# Patient Record
Sex: FEMALE | Race: ASIAN | NOT HISPANIC OR LATINO
[De-identification: names, ages, dates, MRNs, and addresses within clinical notes are randomized per-mention and may not be internally consistent; named-entity substitution may affect disease eponyms.]

---

## 2017-09-28 ENCOUNTER — RECORDS - HEALTHEAST (OUTPATIENT)
Dept: ADMINISTRATIVE | Facility: OTHER | Age: 37
End: 2017-09-28

## 2017-10-02 ENCOUNTER — COMMUNICATION - HEALTHEAST (OUTPATIENT)
Dept: OBGYN | Facility: HOSPITAL | Age: 37
End: 2017-10-02

## 2018-01-22 ENCOUNTER — COMMUNICATION - HEALTHEAST (OUTPATIENT)
Dept: HEALTH INFORMATION MANAGEMENT | Facility: CLINIC | Age: 38
End: 2018-01-22

## 2018-04-02 ENCOUNTER — COMMUNICATION - HEALTHEAST (OUTPATIENT)
Dept: HEALTH INFORMATION MANAGEMENT | Facility: CLINIC | Age: 38
End: 2018-04-02

## 2018-09-05 ENCOUNTER — RECORDS - HEALTHEAST (OUTPATIENT)
Dept: ADMINISTRATIVE | Facility: OTHER | Age: 38
End: 2018-09-05

## 2021-06-16 PROBLEM — Z34.90 PREGNANT: Status: ACTIVE | Noted: 2017-09-28

## 2022-09-29 ENCOUNTER — OFFICE VISIT (OUTPATIENT)
Dept: URGENT CARE | Facility: URGENT CARE | Age: 42
End: 2022-09-29
Payer: COMMERCIAL

## 2022-09-29 VITALS
HEART RATE: 64 BPM | WEIGHT: 123.6 LBS | SYSTOLIC BLOOD PRESSURE: 96 MMHG | DIASTOLIC BLOOD PRESSURE: 61 MMHG | OXYGEN SATURATION: 100 % | TEMPERATURE: 98.2 F | RESPIRATION RATE: 20 BRPM | BODY MASS INDEX: 23.35 KG/M2

## 2022-09-29 DIAGNOSIS — R42 LIGHTHEADEDNESS: ICD-10-CM

## 2022-09-29 DIAGNOSIS — R00.2 PALPITATIONS: Primary | ICD-10-CM

## 2022-09-29 LAB
ANION GAP SERPL CALCULATED.3IONS-SCNC: 6 MMOL/L (ref 3–14)
BUN SERPL-MCNC: 16 MG/DL (ref 7–30)
CALCIUM SERPL-MCNC: 9.1 MG/DL (ref 8.5–10.1)
CHLORIDE BLD-SCNC: 108 MMOL/L (ref 94–109)
CO2 SERPL-SCNC: 27 MMOL/L (ref 20–32)
CREAT SERPL-MCNC: 0.68 MG/DL (ref 0.52–1.04)
ERYTHROCYTE [DISTWIDTH] IN BLOOD BY AUTOMATED COUNT: 12.2 % (ref 10–15)
GFR SERPL CREATININE-BSD FRML MDRD: >90 ML/MIN/1.73M2
GLUCOSE BLD-MCNC: 86 MG/DL (ref 70–99)
HCT VFR BLD AUTO: 40.9 % (ref 35–47)
HGB BLD-MCNC: 13.8 G/DL (ref 11.7–15.7)
MAGNESIUM SERPL-MCNC: 2 MG/DL (ref 1.6–2.3)
MCH RBC QN AUTO: 31.4 PG (ref 26.5–33)
MCHC RBC AUTO-ENTMCNC: 33.7 G/DL (ref 31.5–36.5)
MCV RBC AUTO: 93 FL (ref 78–100)
PLATELET # BLD AUTO: 239 10E3/UL (ref 150–450)
POTASSIUM BLD-SCNC: 4.5 MMOL/L (ref 3.4–5.3)
RBC # BLD AUTO: 4.4 10E6/UL (ref 3.8–5.2)
SODIUM SERPL-SCNC: 141 MMOL/L (ref 133–144)
WBC # BLD AUTO: 7 10E3/UL (ref 4–11)

## 2022-09-29 PROCEDURE — 36415 COLL VENOUS BLD VENIPUNCTURE: CPT | Performed by: EMERGENCY MEDICINE

## 2022-09-29 PROCEDURE — 85027 COMPLETE CBC AUTOMATED: CPT | Performed by: EMERGENCY MEDICINE

## 2022-09-29 PROCEDURE — 99204 OFFICE O/P NEW MOD 45 MIN: CPT | Performed by: EMERGENCY MEDICINE

## 2022-09-29 PROCEDURE — 83735 ASSAY OF MAGNESIUM: CPT | Performed by: EMERGENCY MEDICINE

## 2022-09-29 PROCEDURE — 93000 ELECTROCARDIOGRAM COMPLETE: CPT | Performed by: EMERGENCY MEDICINE

## 2022-09-29 PROCEDURE — 80048 BASIC METABOLIC PNL TOTAL CA: CPT | Performed by: EMERGENCY MEDICINE

## 2022-09-29 NOTE — PROGRESS NOTES
Assessment & Plan     Diagnosis:    (R00.2) Palpitations  (primary encounter diagnosis)    (R42) Lightheadedness      Medical Decision Making:  Babatunde Ngo is a 42 year old female who presents for evaluation of palpitations.  Initial ECG shows normal sinus rhythm.  A broad differential diagnosis was considered including SVT, Atrial fibrillation, ventricular arrhythmia, thyroid disease, acute electrolyte abnormality,  drugs/medications, caffeine intake or other stimulants, medication side effect, anemia, heart disease, PE, etc.  CBC, BMP and magnesium are within normal limits.  She does have borderline lower blood pressure at baseline, no signs of orthostasis here.  She notes that over the last 4 days she has had an upset stomach and lower food and water intake than usual, I suspect this may be contributing to her symptoms. The workup and exam here in urgent care would indicate that supportive outpatient management is indicated. Discussed what orthostatic hypotension is in for the time being recommended electrolyte rehydration, getting up slowly from a seated or supine position. I doubt PE as patient is PERC negative.  Doubt acute coronary syndrome given symptoms and exam.  Will have them follow up with PCP in 2-3 days for recheck.      Patient voices understanding and agreement with the plan including reasons to go to the ER immediately as well as to be seen by a more consistent care-giver, such as their PCP, if the symptoms persist more than 2 days.     45 minutes spent on the date of the encounter doing chart review, review of outside records, review of test results, interpretation of tests, patient visit and documentation       [facility]    Subjective     Babatunde Ngo is a 42 year old female who presents to clinic today for the following health issues:  Chief Complaint   Patient presents with     Tachycardia     Racing heart/irregular heart beat, sob, feels  dizzy/lightheaded when standing up too fast x3 days       HPI  Patient states for the last 3 days has been experiencing racing heart, and lightheadedness primarily when she goes to stand up or get out of bed.  She notes that she is also had some mild abdominal pains which are now resolved, was eating and drinking less over the last few days due to it.  She denies any dark or bloody stools, current abdominal pain, changes in bowel or bladder habits, chest pain, shortness of breath at rest or with minimal exertion, leg swelling, history of DVT/PE, URI symptoms, back pain, new medications, alcohol or drug use or other concerns.    Review of Systems    See HPI    Objective      Vitals: BP 96/61   Pulse 64   Temp 98.2  F (36.8  C) (Tympanic)   Resp 20   Wt 56.1 kg (123 lb 9.6 oz)   SpO2 100%   BMI 23.35 kg/m        Patient Vitals for the past 24 hrs:   BP Temp Temp src Pulse Resp SpO2 Weight   09/29/22 1329 96/61 98.2  F (36.8  C) Tympanic 64 20 100 % 56.1 kg (123 lb 9.6 oz)       Vital signs reviewed by: Elijah Clemons PA-C    Physical Exam   Constitutional: Patient is alert and cooperative. No acute distress.  Ears: External ear canals are normal.  Mouth: Mucous membranes are moist.   Eyes: Conjunctivae, EOMI and lids are normal.  Cardiovascular: Regular rate and rhythm  Pulmonary/Chest: Lungs are clear to auscultation throughout. Effort normal. No respiratory distress. No wheezes, rales or rhonchi.  GI: Abdomen is soft and non-tender throughout. Negative Luque's sign.  Neurological: Alert and oriented x3.   Skin: No rash noted on visualized skin.  Psychiatric:The patient has a normal mood and affect.     Labs/Imaging:  Results for orders placed or performed in visit on 09/29/22   CBC with platelets     Status: Normal   Result Value Ref Range    WBC Count 7.0 4.0 - 11.0 10e3/uL    RBC Count 4.40 3.80 - 5.20 10e6/uL    Hemoglobin 13.8 11.7 - 15.7 g/dL    Hematocrit 40.9 35.0 - 47.0 %    MCV 93 78 - 100 fL    MCH  31.4 26.5 - 33.0 pg    MCHC 33.7 31.5 - 36.5 g/dL    RDW 12.2 10.0 - 15.0 %    Platelet Count 239 150 - 450 10e3/uL   Magnesium     Status: Normal   Result Value Ref Range    Magnesium 2.0 1.6 - 2.3 mg/dL   Basic metabolic panel  (Ca, Cl, CO2, Creat, Gluc, K, Na, BUN)     Status: Normal   Result Value Ref Range    Sodium 141 133 - 144 mmol/L    Potassium 4.5 3.4 - 5.3 mmol/L    Chloride 108 94 - 109 mmol/L    Carbon Dioxide (CO2) 27 20 - 32 mmol/L    Anion Gap 6 3 - 14 mmol/L    Urea Nitrogen 16 7 - 30 mg/dL    Creatinine 0.68 0.52 - 1.04 mg/dL    Calcium 9.1 8.5 - 10.1 mg/dL    Glucose 86 70 - 99 mg/dL    GFR Estimate >90 >60 mL/min/1.73m2       EKG - Reviewed and interpreted by: Elijah Clemons PA-C  Rate: 63 bpm   CO: 182 ms  QTc: 401 ms  Normal sinus rhythm. normal intervals, no acute ST/T changes c/w ischemia, no LVH by voltage criteria.      Elijah Clemons PA-C, September 29, 2022

## 2023-01-17 ENCOUNTER — ALLIED HEALTH/NURSE VISIT (OUTPATIENT)
Dept: FAMILY MEDICINE | Facility: CLINIC | Age: 43
End: 2023-01-17
Payer: COMMERCIAL

## 2023-01-17 DIAGNOSIS — Z23 HIGH PRIORITY FOR 2019-NCOV VACCINE: Primary | ICD-10-CM

## 2023-01-17 DIAGNOSIS — Z23 NEED FOR PROPHYLACTIC VACCINATION AND INOCULATION AGAINST INFLUENZA: ICD-10-CM

## 2023-01-17 PROCEDURE — 91313 COVID-19 VACCINE BIVALENT BOOSTER 18+ (MODERNA): CPT

## 2023-01-17 PROCEDURE — 90471 IMMUNIZATION ADMIN: CPT

## 2023-01-17 PROCEDURE — 99207 PR NO CHARGE NURSE ONLY: CPT

## 2023-01-17 PROCEDURE — 0134A COVID-19 VACCINE BIVALENT BOOSTER 18+ (MODERNA): CPT

## 2023-01-17 PROCEDURE — 90686 IIV4 VACC NO PRSV 0.5 ML IM: CPT
